# Patient Record
Sex: MALE | Race: AMERICAN INDIAN OR ALASKA NATIVE | NOT HISPANIC OR LATINO
[De-identification: names, ages, dates, MRNs, and addresses within clinical notes are randomized per-mention and may not be internally consistent; named-entity substitution may affect disease eponyms.]

---

## 2021-10-01 PROBLEM — Z00.00 ENCOUNTER FOR PREVENTIVE HEALTH EXAMINATION: Status: ACTIVE | Noted: 2021-10-01

## 2022-01-03 ENCOUNTER — APPOINTMENT (OUTPATIENT)
Dept: UROLOGY | Facility: CLINIC | Age: 67
End: 2022-01-03
Payer: SUBSIDIZED

## 2022-01-03 VITALS — TEMPERATURE: 97.4 F | WEIGHT: 180 LBS | HEIGHT: 67 IN | BODY MASS INDEX: 28.25 KG/M2

## 2022-01-03 DIAGNOSIS — Z78.9 OTHER SPECIFIED HEALTH STATUS: ICD-10-CM

## 2022-01-03 DIAGNOSIS — Z12.5 ENCOUNTER FOR SCREENING FOR MALIGNANT NEOPLASM OF PROSTATE: ICD-10-CM

## 2022-01-03 DIAGNOSIS — Z83.3 FAMILY HISTORY OF DIABETES MELLITUS: ICD-10-CM

## 2022-01-03 DIAGNOSIS — N52.9 MALE ERECTILE DYSFUNCTION, UNSPECIFIED: ICD-10-CM

## 2022-01-03 DIAGNOSIS — Z87.448 PERSONAL HISTORY OF OTHER DISEASES OF URINARY SYSTEM: ICD-10-CM

## 2022-01-03 DIAGNOSIS — R31.9 HEMATURIA, UNSPECIFIED: ICD-10-CM

## 2022-01-03 DIAGNOSIS — Z82.49 FAMILY HISTORY OF ISCHEMIC HEART DISEASE AND OTHER DISEASES OF THE CIRCULATORY SYSTEM: ICD-10-CM

## 2022-01-03 DIAGNOSIS — Z86.79 PERSONAL HISTORY OF OTHER DISEASES OF THE CIRCULATORY SYSTEM: ICD-10-CM

## 2022-01-03 PROCEDURE — 99204 OFFICE O/P NEW MOD 45 MIN: CPT

## 2022-01-03 PROCEDURE — 99072 ADDL SUPL MATRL&STAF TM PHE: CPT

## 2022-01-07 NOTE — ASSESSMENT
[FreeTextEntry1] : SIMON MONREAL is a 66 year year old presenting for evaluation and consultation for Nocturia and weak urinary stream. \par Urologic History includes a prostate biopsy for unknown indication, and a cystoscopy for gross hematuria. Patient reports that it was negative. No records to review and patient reports that he is unable to get records.\par \par Urinary symptoms are bothersome. He is currently taking two medications which he thinks is Tamsulosin and Finasteride. \par Will obtain Kidney Bladder Sonogram and Urine studies including cytology given history of gross hematuria. \par \par Erectile Dysfunction: Patient does report dyspnea on exertion. Although he reports stress test was negative 7 months ago;\par Will obtain cardiology [place] Criteria. \par \par Prostate Cancer screening: Patient does not recall when last PSA was done. \par Will obtain a PSA

## 2022-01-07 NOTE — REVIEW OF SYSTEMS
[see HPI] : see HPI [Fever] : no fever [Chills] : no chills [Chest Pain] : no chest pain [Shortness Of Breath] : no shortness of breath [Abdominal Pain] : no abdominal pain

## 2022-01-07 NOTE — HISTORY OF PRESENT ILLNESS
[FreeTextEntry1] : SIMON MONREAL is a 66 year year old presenting for evaluation and consultation for Nocturia and weak urinary stream. \par Patient has a past medical history of high blood pressure. History of gross hematuria and was evaluated by another urologist. Patient does report a cystoscopy was done and was told it was normal. \par \par Urination symptoms: Patient reports nocturia 6-7 x nightly. Patient reports that he has weak urinary stream. Patient reports post void dribbling. Patient denies dysuria and gross hematuria. Patient is currently taking two medications for urinating. He thinks it is Tamsulosin and Finasteride daily but is not sure. \par IPSS: 15/35 Moderate symptoms\par \par Erections: Patient reports weak erections. Patient denies ever taking medication for erections. Patient does report he had a stress test 7 months ago with a cardiologist and reports that he was told everything was normal. He denies ever having a heart attack. Patient denies taking Nitroglycerine. \par IIEF: 11/25 Moderate Erectile Dysfunction. \par \par Prostate Cancer Screening: Patient reports that he had a prostate biopsy 4-5 years ago which he reports was negative. Patient is unsure why he had a prostate biopsy done. \par Patient does not know if he has had a PSA done since the prostate biopsy. \par \par Liquid Intake: 1 cup of coffee daily. \par Occupation: \par \par Old Records: No old records to review. Patient is unsure of the name of his prior Urologist and states he is unable to get the records. No records of past cystoscopy or prostate biopsy. \par \par Primary Care Doctor: Patient reports he sees a group of family doctors but does not know their names. \par

## 2022-01-11 ENCOUNTER — LABORATORY RESULT (OUTPATIENT)
Age: 67
End: 2022-01-11

## 2022-01-12 LAB
APPEARANCE: CLEAR
BILIRUBIN URINE: NEGATIVE
BLOOD URINE: NEGATIVE
COLOR: YELLOW
GLUCOSE QUALITATIVE U: NEGATIVE
KETONES URINE: NEGATIVE
LEUKOCYTE ESTERASE URINE: NEGATIVE
NITRITE URINE: NEGATIVE
PH URINE: 6.5
PROTEIN URINE: ABNORMAL
PSA FREE FLD-MCNC: 19 %
PSA FREE SERPL-MCNC: 0.21 NG/ML
PSA SERPL-MCNC: 1.1 NG/ML
SPECIFIC GRAVITY URINE: 1.02
UROBILINOGEN URINE: NORMAL

## 2022-01-13 ENCOUNTER — RESULT REVIEW (OUTPATIENT)
Age: 67
End: 2022-01-13

## 2022-01-13 ENCOUNTER — OUTPATIENT (OUTPATIENT)
Dept: OUTPATIENT SERVICES | Facility: HOSPITAL | Age: 67
LOS: 1 days | Discharge: HOME | End: 2022-01-13
Payer: MEDICARE

## 2022-01-13 DIAGNOSIS — N52.9 MALE ERECTILE DYSFUNCTION, UNSPECIFIED: ICD-10-CM

## 2022-01-13 DIAGNOSIS — N39.43 POST-VOID DRIBBLING: ICD-10-CM

## 2022-01-13 LAB — BACTERIA UR CULT: NORMAL

## 2022-01-13 PROCEDURE — 76770 US EXAM ABDO BACK WALL COMP: CPT | Mod: 26

## 2022-01-18 LAB — URINE CYTOLOGY: NORMAL

## 2022-01-28 ENCOUNTER — APPOINTMENT (OUTPATIENT)
Dept: UROLOGY | Facility: CLINIC | Age: 67
End: 2022-01-28
Payer: MEDICARE

## 2022-01-28 VITALS — HEIGHT: 67 IN | WEIGHT: 180 LBS | BODY MASS INDEX: 28.25 KG/M2

## 2022-01-28 PROCEDURE — 99214 OFFICE O/P EST MOD 30 MIN: CPT

## 2022-01-28 NOTE — HISTORY OF PRESENT ILLNESS
[FreeTextEntry1] : \par SIMON MONREAL is a 66 year year old presenting for evaluation and consultation for Nocturia and weak urinary stream. \par Urologic History includes a prostate biopsy for unknown indication, and a cystoscopy for gross hematuria. Patient reports that it was negative. No records to review and patient reports that he is unable to get records. Low psa\par \par Urinary symptoms are bothersome. He is currently taking two medications  Tamsulosin and Finasteride. \par Massive prostate on sonogram\par \par Jan 2022 -- prostate 265 -- \par \par Erectile Dysfunction: Patient does report dyspnea on exertion. Although he reports stress test was negative 7 months ago;\par Will obtain cardiology [place] Criteria. \par \par Prostate Cancer screening: Patient does not recall when last PSA was done. \par \par \par Jan 2022\par PSA Profile - Total  -- 1.1\par Urine Cytology; rare atypical cells\par US Kidney and Bladder; - 265g prostate with high pvr\par Culture - Urine; no growht\par Urinalysis w/Reflex; + protein\par . \par

## 2022-01-28 NOTE — ASSESSMENT
[FreeTextEntry1] : \par SIMON MONREAL is a 66 year year old presenting for evaluation and consultation for Nocturia and weak urinary stream. \par Urologic History includes a prostate biopsy for unknown indication, and a cystoscopy for gross hematuria. Patient reports that it was negative. No records to review and patient reports that he is unable to get records. Low psa\par \par Urinary symptoms are bothersome. He is currently taking two medications  Tamsulosin and Finasteride. \par Massive prostate on sonogram\par \par Jan 2022 -- prostate 265 -- \par \par Erectile Dysfunction: Patient does report dyspnea on exertion. Although he reports stress test was negative 7 months ago;\par Will obtain cardiology [place] Criteria. \par \par Prostate Cancer screening: Patient does not recall when last PSA was done. \par \par \par Jan 2022\par PSA Profile - Total  -- 1.1\par Urine Cytology; rare atypical cells\par US Kidney and Bladder; - 265g prostate with high pvr\par Culture - Urine; no growht\par Urinalysis w/Reflex; + protein\par .

## 2022-02-10 ENCOUNTER — APPOINTMENT (OUTPATIENT)
Dept: UROLOGY | Facility: CLINIC | Age: 67
End: 2022-02-10

## 2022-05-25 ENCOUNTER — APPOINTMENT (OUTPATIENT)
Dept: UROLOGY | Facility: CLINIC | Age: 67
End: 2022-05-25
Payer: MEDICARE

## 2022-05-25 VITALS — BODY MASS INDEX: 28.25 KG/M2 | HEIGHT: 67 IN | WEIGHT: 180 LBS

## 2022-05-25 PROCEDURE — 99213 OFFICE O/P EST LOW 20 MIN: CPT

## 2022-05-25 RX ORDER — PAPAVERINE HYDROCHLORIDE 30 MG/ML
30 INJECTION, SOLUTION INTRAVENOUS
Qty: 0.5 | Refills: 0 | Status: ACTIVE | COMMUNITY
Start: 2022-05-25 | End: 1900-01-01

## 2022-05-25 NOTE — HISTORY OF PRESENT ILLNESS
[FreeTextEntry1] : SIMON MONREAL is a 66 year year old presenting for evaluation and consultation for Nocturia and weak urinary stream. \par Urologic History includes a prostate biopsy for unknown indication, and a cystoscopy for gross hematuria. Patient reports that it was negative. No records to review and patient reports that he is unable to get records. Low psa\par \par Urinary symptoms are bothersome. He is currently taking two medications Tamsulosin and Finasteride. \par Massive prostate on sonogram\par \par Jan 2022 -- prostate 265 -- \par \par Erectile Dysfunction: Patient does report dyspnea on exertion. Although he reports stress test was negative 10 months ago\par not interested in oral medications\par \par Jan 2022\par PSA Profile - Total -- 1.1\par Urine Cytology; rare atypical cells\par US Kidney and Bladder; - 265g prostate with high pvr 290\par Culture - Urine; no growht\par Urinalysis w/Reflex; + protein\par .

## 2022-06-16 ENCOUNTER — APPOINTMENT (OUTPATIENT)
Dept: UROLOGY | Facility: CLINIC | Age: 67
End: 2022-06-16
Payer: MEDICARE

## 2022-06-16 PROCEDURE — 99215 OFFICE O/P EST HI 40 MIN: CPT

## 2022-06-16 RX ORDER — TAMSULOSIN HYDROCHLORIDE 0.4 MG/1
0.4 CAPSULE ORAL
Qty: 30 | Refills: 3 | Status: ACTIVE | COMMUNITY
Start: 2022-06-16 | End: 1900-01-01

## 2022-06-16 RX ORDER — FINASTERIDE 5 MG/1
5 TABLET, FILM COATED ORAL
Qty: 90 | Refills: 3 | Status: ACTIVE | COMMUNITY
Start: 2022-06-16 | End: 1900-01-01

## 2022-06-16 NOTE — HISTORY OF PRESENT ILLNESS
[FreeTextEntry1] : SIMON MONREAL is a 66 year old male who presents for consultation for large BPH/LUTS. \par \par Denies gross hematuria, dysuria or associated symptoms.  Patient reports bothersome lower urinary tract symptoms including weak force of stream urinary frequency urgency nocturia 6-7 times.\par \par Jan 2022\par PSA Profile - Total -- 1.1\par Urine Cytology; rare atypical cells\par \par US Kidney and Bladder; - 265g prostate with high pvr 290, images visualized and I agree with the findings there is indeed a massive median lobe\par Culture - Urine; no growht\par Urinalysis w/Reflex; + protein\par \par Denies  PMH including previous kidney stones, recurrent UTIs.  Has had prior retention\par Family History: No  malignancies\par Social History: Former \par PSH none

## 2022-06-29 ENCOUNTER — APPOINTMENT (OUTPATIENT)
Dept: UROLOGY | Facility: CLINIC | Age: 67
End: 2022-06-29

## 2022-06-29 DIAGNOSIS — N52.01 ERECTILE DYSFUNCTION DUE TO ARTERIAL INSUFFICIENCY: ICD-10-CM

## 2022-06-29 PROCEDURE — 99215 OFFICE O/P EST HI 40 MIN: CPT

## 2022-06-29 RX ORDER — PAPAVERINE HYDROCHLORIDE 30 MG/ML
30 INJECTION, SOLUTION INTRAVENOUS
Qty: 5 | Refills: 5 | Status: ACTIVE | COMMUNITY
Start: 2022-06-29 | End: 1900-01-01

## 2022-06-29 NOTE — PHYSICAL EXAM
[General Appearance - In No Acute Distress] : no acute distress [Urethral Meatus] : meatus normal [Penis Abnormality] : normal circumcised penis [] : no respiratory distress [Oriented To Time, Place, And Person] : oriented to person, place, and time [Normal Station and Gait] : the gait and station were normal for the patient's age

## 2022-07-18 PROBLEM — N52.01 ERECTILE DYSFUNCTION DUE TO ARTERIAL INSUFFICIENCY: Status: ACTIVE | Noted: 2022-01-03

## 2022-07-18 NOTE — ASSESSMENT
[FreeTextEntry1] : Patient reevaluated 10 minutes after injection.  Injection dosage today of Trimix 30-1-10 was 0.2 mL.\par Patient had a less than 10% erectile response to medication.\par \par Patient is interested in trialing higher concentration of Trimix at home.\par Patient is aware to start injections at 0.2 mL and increase by 0.05 mL as needed.  Patient is aware if he has an erection that lasts longer than 3 hours he must seek emergency care.  Patient verbalized understanding.\par \par Plan\par -Trimix 30–2–20 prescribed\par -Follow-up 6 to 8 weeks to review and reassess\par -Continue Follow up with Dr. Lira for BPH.

## 2022-08-03 ENCOUNTER — APPOINTMENT (OUTPATIENT)
Dept: UROLOGY | Facility: CLINIC | Age: 67
End: 2022-08-03

## 2022-09-20 ENCOUNTER — APPOINTMENT (OUTPATIENT)
Dept: UROLOGY | Facility: CLINIC | Age: 67
End: 2022-09-20

## 2022-09-20 DIAGNOSIS — R35.1 NOCTURIA: ICD-10-CM

## 2022-09-20 PROCEDURE — 99214 OFFICE O/P EST MOD 30 MIN: CPT | Mod: 25

## 2022-09-20 PROCEDURE — 51736 URINE FLOW MEASUREMENT: CPT

## 2022-09-23 NOTE — ASSESSMENT
[FreeTextEntry1] : SIMON MONREAL is a 66 year old male history of urinary retention over 5 years ago who presents for consultation for large BPH/LUTS, despite tamsulosin and finasteride, incomplete bladder emptying\par \par Discussed options with the patient including continued medical management versus surgical options.\par Patient is interested in surgical intervention.  We will obtain video urodynamics for further investigation and will follow-up to discuss definitive surgical management.\par \par Continue tamsulosin/finasteride.\par

## 2022-09-23 NOTE — HISTORY OF PRESENT ILLNESS
[FreeTextEntry1] : SIMON MONREAL is a 66 year old male who presents for consultation for large BPH/LUTS. \par \par Patient having severe urinary frequency/urgency with decreased force of stream despite tamsulosin and finasteride.  He reports 6 episodes of nocturia with pushing/straining to void.  He has not satisfied with the way his voiding and while at his last visit he declined surgical intervention he is now looking for surgical options.\par \par Uroflow study demonstrates significantly prolonged voiding time 2.37 with Q-Atul/average flow of 4.2/1.4 voided volume was 151 cc with a postvoid residual 407 cc.  Patient is able to void again with a postvoid residual of 234 cc.\par \par Denies gross hematuria, dysuria or associated symptoms\par \par Previously:\par Jan 2022\par PSA Profile - Total -- 1.1\par Urine Cytology; rare atypical cells\par \par US Kidney and Bladder; - 265g prostate with high pvr 290, images visualized and I agree with the findings there is indeed a massive median lobe\par Culture - Urine; no growht\par Urinalysis w/Reflex; + protein\par \par Denies  PMH including previous kidney stones, recurrent UTIs.  Has had prior retention\par Family History: No  malignancies\par Social History: Former \par PSH none

## 2022-10-27 ENCOUNTER — OUTPATIENT (OUTPATIENT)
Dept: OUTPATIENT SERVICES | Facility: HOSPITAL | Age: 67
LOS: 1 days | Discharge: HOME | End: 2022-10-27

## 2022-10-27 ENCOUNTER — APPOINTMENT (OUTPATIENT)
Dept: UROLOGY | Facility: HOSPITAL | Age: 67
End: 2022-10-27
Payer: MEDICARE

## 2022-10-27 DIAGNOSIS — R35.1 NOCTURIA: ICD-10-CM

## 2022-10-27 DIAGNOSIS — N39.43 POST-VOID DRIBBLING: ICD-10-CM

## 2022-10-27 DIAGNOSIS — R33.9 RETENTION OF URINE, UNSPECIFIED: ICD-10-CM

## 2022-10-27 PROCEDURE — 51784 ANAL/URINARY MUSCLE STUDY: CPT | Mod: 26

## 2022-10-27 PROCEDURE — 76000 FLUOROSCOPY <1 HR PHYS/QHP: CPT | Mod: 26,59

## 2022-10-27 PROCEDURE — 51728 CYSTOMETROGRAM W/VP: CPT | Mod: 26

## 2022-10-27 PROCEDURE — 51797 INTRAABDOMINAL PRESSURE TEST: CPT | Mod: 26

## 2022-10-27 PROCEDURE — 51600 INJECTION FOR BLADDER X-RAY: CPT

## 2022-10-28 PROBLEM — N39.43 POST-VOID DRIBBLING: Status: ACTIVE | Noted: 2022-01-03

## 2022-10-28 PROBLEM — R35.1 NOCTURIA: Status: ACTIVE | Noted: 2022-01-03

## 2022-10-28 RX ORDER — PROPRANOLOL HYDROCHLORIDE 20 MG/1
20 TABLET ORAL
Qty: 90 | Refills: 0 | Status: ACTIVE | COMMUNITY
Start: 2022-09-02

## 2022-10-28 RX ORDER — AMLODIPINE BESYLATE 10 MG/1
10 TABLET ORAL
Qty: 90 | Refills: 0 | Status: ACTIVE | COMMUNITY
Start: 2022-09-01

## 2022-11-17 ENCOUNTER — APPOINTMENT (OUTPATIENT)
Dept: UROLOGY | Facility: CLINIC | Age: 67
End: 2022-11-17

## 2022-11-17 VITALS
HEIGHT: 67 IN | SYSTOLIC BLOOD PRESSURE: 128 MMHG | TEMPERATURE: 97.6 F | BODY MASS INDEX: 28.25 KG/M2 | RESPIRATION RATE: 18 BRPM | DIASTOLIC BLOOD PRESSURE: 78 MMHG | WEIGHT: 180 LBS | OXYGEN SATURATION: 98 % | HEART RATE: 64 BPM

## 2022-11-17 DIAGNOSIS — N13.8 BENIGN PROSTATIC HYPERPLASIA WITH LOWER URINARY TRACT SYMPMS: ICD-10-CM

## 2022-11-17 DIAGNOSIS — N40.1 BENIGN PROSTATIC HYPERPLASIA WITH LOWER URINARY TRACT SYMPMS: ICD-10-CM

## 2022-11-17 PROCEDURE — 99215 OFFICE O/P EST HI 40 MIN: CPT

## 2022-11-17 NOTE — PHYSICAL EXAM
[Abdomen Soft] : soft [Abdomen Mass (___ Cm)] : no abdominal mass palpated [Abdomen Hernia] : no hernia was discovered [FreeTextEntry1] : no scars , thin

## 2022-11-17 NOTE — ADDENDUM
[FreeTextEntry1] : Patient's note was transcribed with the assistance of a medical scribe under the supervision of Dr. Lira.\par I, Dr. Lira, have reviewed the patient's chart and agree that it aligns with my medical decisions.\par Lisa Palacios, our scribe, also served as a chaperone for physical examination purposes.\par \par \par

## 2022-11-17 NOTE — HISTORY OF PRESENT ILLNESS
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 \par \par previously \par Patient having severe urinary frequency/urgency with decreased force of stream despite tamsulosin and finasteride.  He reports 6 episodes of nocturia with pushing/straining to void.  He has not satisfied with the way his voiding and while at his last visit he declined surgical intervention he is now looking for surgical options.\par \par Uroflow study demonstrates significantly prolonged voiding time 2.37 with Q-Atul/average flow of 4.2/1.4 voided volume was 151 cc with a postvoid residual 407 cc.  Patient is able to void again with a postvoid residual of 234 cc.\par \par Previously:\par Jan 2022\par PSA Profile - Total -- 1.1\par Urine Cytology; rare atypical cells\par \par US Kidney and Bladder; - 265g prostate with high pvr 290, images visualized and I agree with the findings there is indeed a massive median lobe\par Culture - Urine; no growht\par Urinalysis w/Reflex; + protein\par \par Denies  PMH including previous kidney stones, recurrent UTIs.  Has had prior retention\par Family History: No  malignancies\par Social History: Former \par PSH none

## 2022-11-17 NOTE — ASSESSMENT
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  Discussed potentially higher risk of bleeding given his very large BPH and therefore he will be observed overnight.  He is aware of retrograde ejaculation is not concerned\par \par \par With respect to simple prostatectomy, the pros and cons of open vs robotic-assisted simple prostatectomy were discussed. The complications of this procedure were reviewed with the patient and include (but not limited to) urinary incontinence secondary to detrusor overactivity vs JOHN, erectile dysfunction, infertility w retrograde ejaculation, BNC,  hemorrhage requiring transfusion, rectal, ureteral, infection, cardiovascular, pulmonary, thromboembolic, and anesthetic complications.  For robotic prostatectomy, the additional complications of urine leak and small bowel obstruction from adhesions was conveyed.  We discussed the possibility of injuring surrounding structures such as bowel .We also discussed the need for a urethral catheter as well as a MARKO drain post-operatively.\par Lastly we discussed the possibility that simple prostatectomy may not resolve the patient's urinary retention and that he may need indwelling catheterization or CIC.\par

## 2022-12-05 ENCOUNTER — NON-APPOINTMENT (OUTPATIENT)
Age: 67
End: 2022-12-05